# Patient Record
Sex: MALE | Race: ASIAN | NOT HISPANIC OR LATINO | URBAN - METROPOLITAN AREA
[De-identification: names, ages, dates, MRNs, and addresses within clinical notes are randomized per-mention and may not be internally consistent; named-entity substitution may affect disease eponyms.]

---
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
## 2025-06-18 ENCOUNTER — APPOINTMENT (OUTPATIENT)
Dept: PULMONOLOGY | Facility: CLINIC | Age: 20
End: 2025-06-18

## 2025-07-15 NOTE — ED PEDIATRIC NURSE NOTE - PRIMARY CARE PROVIDER
Patients pharmacy called into the office and states she needs a refill on her lancets please, didn't see on list, sorry.    n/a